# Patient Record
Sex: FEMALE | Race: BLACK OR AFRICAN AMERICAN | NOT HISPANIC OR LATINO | URBAN - METROPOLITAN AREA
[De-identification: names, ages, dates, MRNs, and addresses within clinical notes are randomized per-mention and may not be internally consistent; named-entity substitution may affect disease eponyms.]

---

## 2017-04-03 ENCOUNTER — IMPORTED ENCOUNTER (OUTPATIENT)
Dept: URBAN - METROPOLITAN AREA CLINIC 1 | Facility: CLINIC | Age: 52
End: 2017-04-03

## 2017-04-03 PROBLEM — H25.13: Noted: 2017-04-03

## 2017-04-03 PROBLEM — Z79.84: Noted: 2017-04-03

## 2017-04-03 PROBLEM — E11.3393: Noted: 2017-04-03

## 2017-04-03 PROBLEM — H01.004: Noted: 2017-04-03

## 2017-04-03 PROBLEM — H00.012: Noted: 2017-04-03

## 2017-04-03 PROBLEM — H01.001: Noted: 2017-04-03

## 2017-04-03 PROCEDURE — 92014 COMPRE OPH EXAM EST PT 1/>: CPT

## 2017-04-03 NOTE — PATIENT DISCUSSION
1.  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 Stressed the importance of strong glucose control. Advised of importance of at least yearly dilated examinations but to contact us immediately for any problems or concerns. 2. Anterior and Posterior Blepharitis OU - Begin Daily warm compresses and lid scrubs were recommended. 3. Hordeolum OD RLL (new): Begin lid scrubs w/ warm compresses daily. Could consider I&D if no improveemnt PRN. 4.  NS Cataract OU: Observe for now without intervention. The patient was advised to contact us if any change or worsening of visionLetter to PCP Return for an appointment in 6 mo 10 dfe with Dr. Sarai Mcguire.

## 2017-10-02 ENCOUNTER — IMPORTED ENCOUNTER (OUTPATIENT)
Dept: URBAN - METROPOLITAN AREA CLINIC 1 | Facility: CLINIC | Age: 52
End: 2017-10-02

## 2017-10-02 PROBLEM — E11.3293: Noted: 2017-10-02

## 2017-10-02 PROBLEM — H01.004: Noted: 2017-10-02

## 2017-10-02 PROBLEM — Z79.84: Noted: 2017-10-02

## 2017-10-02 PROBLEM — H04.123: Noted: 2017-10-02

## 2017-10-02 PROBLEM — H25.13: Noted: 2017-10-02

## 2017-10-02 PROBLEM — H01.001: Noted: 2017-10-02

## 2017-10-02 PROCEDURE — 92012 INTRM OPH EXAM EST PATIENT: CPT

## 2017-10-02 NOTE — PATIENT DISCUSSION
1.  DM Type II (Oral Meds) with Mild Nonproliferative Diabetic Retinopathy OU No Macular Edema:  Discussed the pathophysiology of diabetes and its effect on the eye and risk of blindness. Stressed the importance of strong glucose control. Advised of importance of at least yearly dilated examinations but to contact us immediately for any problems or concerns. 2. Cataract OU: Observe for now without intervention. The patient was advised to contact us if any change or worsening of vision3. Anterior/ Posterior Blepharitis OU - Continue Daily warm compresses and lid scrubs were recommended. 4. Dry Eyes OU - Begin ATs BID OU routinely. 5.  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 Return for an appointment in 6 mo 30 with Dr. Ever Steele.

## 2017-10-19 ENCOUNTER — IMPORTED ENCOUNTER (OUTPATIENT)
Dept: URBAN - METROPOLITAN AREA CLINIC 1 | Facility: CLINIC | Age: 52
End: 2017-10-19

## 2017-10-19 PROBLEM — H25.13: Noted: 2017-10-19

## 2017-10-19 PROBLEM — Z79.899: Noted: 2017-10-19

## 2017-10-19 PROBLEM — H01.004: Noted: 2017-10-19

## 2017-10-19 PROBLEM — M05.80: Noted: 2017-10-19

## 2017-10-19 PROBLEM — H01.001: Noted: 2017-10-19

## 2017-10-19 PROCEDURE — 92012 INTRM OPH EXAM EST PATIENT: CPT

## 2017-10-19 PROCEDURE — 92083 EXTENDED VISUAL FIELD XM: CPT

## 2017-10-19 NOTE — PATIENT DISCUSSION
1.   Plaquenil without evidence of Toxicity or Plaquenil retinopathy on High Risk Medication for Rheumatoid Arthritis. HVF showed non specific loss OU WNL OU. [id number] Ida Ramirez for patient to remain on Plaquenil. 2.  Anterior/Posterior Blepharitis OU - Daily warm compresses and lid scrubs were recommended. 3. Cataract OU: Observe for now without intervention. The patient was advised to contact us if any change or worsening of vision4. Dry Eyes OU 5. H/o DM w/ Mild NPDR OU6. H/o Episcleritis OSReturn for an appointment for Return as scheduled with Dr. Lavelle Romero.

## 2018-04-09 ENCOUNTER — IMPORTED ENCOUNTER (OUTPATIENT)
Dept: URBAN - METROPOLITAN AREA CLINIC 1 | Facility: CLINIC | Age: 53
End: 2018-04-09

## 2018-04-09 PROBLEM — Z79.899: Noted: 2018-04-09

## 2018-04-09 PROBLEM — E11.3293: Noted: 2018-04-09

## 2018-04-09 PROBLEM — E11.9: Noted: 2018-04-09

## 2018-04-09 PROBLEM — Z79.84: Noted: 2018-04-09

## 2018-04-09 PROBLEM — H01.002: Noted: 2018-04-09

## 2018-04-09 PROBLEM — H01.005: Noted: 2018-04-09

## 2018-04-09 PROBLEM — H25.13: Noted: 2018-04-09

## 2018-04-09 PROCEDURE — 92014 COMPRE OPH EXAM EST PT 1/>: CPT

## 2018-04-09 NOTE — PATIENT DISCUSSION
1.  DM Type II without sign of diabetic retinopathy and no blot heme on dilated retinal examination today OU No Macular Edema: Improved OU. (h/o Mild NPDR  OU- not visualized toay OU.) Discussed the pathophysiology of diabetes and its effect on the eye and risk of blindness. Stressed the importance of strong glucose control. Advised of importance of at least yearly dilated examinations but to contact us immediately for any problems or concerns. 2. Type II diabetes controlled by oral medications. 3.  Blepharitis anterior type OU- Much improved. Continue daily warm compresses and lid scrubs were recommended. 4. Cataract OU: Observe for now without intervention. The patient was advised to contact us if any change or worsening of vision5. Patient defers the refraction at today's visit    6. Plaquenil without evidence of Toxicity or Plaquenil retinopathy on High Risk Medication. [id number] Ida Ramirez for patient to remain on Plaquenil. 7.  Return for an appointment for a dfe HVF 10-2 OU in 6 months with Dr. Miles Alejandre.

## 2018-10-09 ENCOUNTER — IMPORTED ENCOUNTER (OUTPATIENT)
Dept: URBAN - METROPOLITAN AREA CLINIC 1 | Facility: CLINIC | Age: 53
End: 2018-10-09

## 2018-10-09 PROBLEM — H04.123: Noted: 2018-10-09

## 2018-10-09 PROBLEM — H25.13: Noted: 2018-10-09

## 2018-10-09 PROBLEM — Z79.899: Noted: 2018-10-09

## 2018-10-09 PROBLEM — H10.45: Noted: 2018-10-09

## 2018-10-09 PROBLEM — Z79.84: Noted: 2018-10-09

## 2018-10-09 PROBLEM — E11.3293: Noted: 2018-10-09

## 2018-10-09 PROBLEM — E11.3393: Noted: 2018-10-09

## 2018-10-09 PROCEDURE — 92083 EXTENDED VISUAL FIELD XM: CPT

## 2018-10-09 PROCEDURE — 92012 INTRM OPH EXAM EST PATIENT: CPT

## 2018-10-09 NOTE — PATIENT DISCUSSION
Plaquenil without evidence of Toxicity or Plaquenil retinopathy on High Risk Medication. [id number] Ida Ramirez for patient to remain on Plaquenil.

## 2018-10-09 NOTE — PATIENT DISCUSSION
1.  DM Type II with Moderate Nonproliferative Diabetic Retinopathy OU No Macular Edema: Progressed OU. Discussed the pathophysiology of diabetes and its effect on the eye and risk of blindness. Stressed the importance of strong glucose control. Advised of importance of at least yearly dilated examinations but to contact us immediately for any problems or concerns. 2. Type II diabetes controlled by oral medications. 3.  R.A.- on high risk med- Plaquenil- without evidence of Toxicity or Plaquenil retinopathy on High Risk Medication. [id number] Ida Ramirez for patient to remain on Plaquenil. Normal HVF OU4. Allergic Conjunctivitis OU- Recommend the start of Patanol OU BID (RX given.) If unable to get Patanol pt advised to start Zaditor OU BID. The condition was  discussed with the patient. Avoidance of allergens and cool compresses were recommended. 5. Dry Eyes OU- Controlled. The continuation of artificial tears were recommended. 6.  Cataract OU: Observe for now without intervention. The patient was advised to contact us if any change or worsening of vision7. Return for an appointment for 30 in 6 months with Dr. Juan Francisco Coats.

## 2018-10-09 NOTE — PATIENT DISCUSSION
DM Type II with Mild Nonproliferative Diabetic Retinopathy OU No Macular Edema:  Discussed the pathophysiology of diabetes and its effect on the eye and risk of blindness. Stressed the importance of strong glucose control. Advised of importance of at least yearly dilated examinations but to contact us immediately for any problems or concerns.

## 2019-05-02 ENCOUNTER — IMPORTED ENCOUNTER (OUTPATIENT)
Dept: URBAN - METROPOLITAN AREA CLINIC 1 | Facility: CLINIC | Age: 54
End: 2019-05-02

## 2019-05-02 PROBLEM — E11.3393: Noted: 2019-05-02

## 2019-05-02 PROBLEM — Z79.84: Noted: 2019-05-02

## 2019-05-02 PROCEDURE — 92014 COMPRE OPH EXAM EST PT 1/>: CPT

## 2019-05-02 PROCEDURE — 92015 DETERMINE REFRACTIVE STATE: CPT

## 2019-05-02 NOTE — PATIENT DISCUSSION
1. 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 Stressed the importance of strong glucose control. Advised of importance of at least yearly dilated examinations but to contact us immediately for any problems or concerns. 2. High Risk Medication Plaquenil due to Systemic RA with no signs of Plaquenil Retinopathy OU. Ok to remain on Plaquenil. 3.  Cataract OU: Observe for now without intervention. The patient was advised to contact us if any change or worsening of vision4. Anterior/ Posterior Blepharitis OU - Continue Daily warm compresses and lid scrubs were recommended. 5. Dry Eyes OU - Cont ATs BID OU routinely. Written script for Refresh given to patient today. 6.  H/o Episcleritis OD with h/o Systemic RA Episcleritis inactive. Letter to PCP MRx for glasses givenReturn for an appointment in 6 mo 10 dfe VF 10-2 color plates with Dr. Margaux Bee.

## 2019-07-22 ENCOUNTER — IMPORTED ENCOUNTER (OUTPATIENT)
Dept: URBAN - METROPOLITAN AREA CLINIC 1 | Facility: CLINIC | Age: 54
End: 2019-07-22

## 2019-11-13 NOTE — PATIENT DISCUSSION
Recommend Bilateral upper lid blepharoplasty. Medicare (discussed risks and benefits of sx. ..). Discussed surgery is functional through insurance, not cosmetic.

## 2019-11-13 NOTE — PATIENT DISCUSSION
This visual field clearly demonstrated a minimum of 49% loss of upper field of vision OU, with upper lid skin in repose and elevated by taping of the lid to demonstrate potential correction. This field shows that taping the lids significantly improved this patient's superior field of vision by approximately 49%, OU.

## 2020-02-03 NOTE — PROCEDURE NOTE: SURGICAL
"<span style=""font-weight: bold;"">MR #:&nbsp;</span> [id number]<br />  <br /> <span style=""font-weight: bold;""> PREOPERATIVE DIAGNOSIS:&nbsp;</span> Dermatochalasis upper lids<br />  <br /> <span style=""font-weight: bold;""> POSTOPERATIVE DIAGNOSIS:&nbsp;</span> Same<br />  <br /> <span style=""font-weight: bold;""> OPERATIVE PROCEDURE:</span>&nbsp;  Upper lid blepharoplasty with fat removal

## 2020-02-07 ENCOUNTER — IMPORTED ENCOUNTER (OUTPATIENT)
Dept: URBAN - METROPOLITAN AREA CLINIC 1 | Facility: CLINIC | Age: 55
End: 2020-02-07

## 2020-02-07 PROCEDURE — 92083 EXTENDED VISUAL FIELD XM: CPT

## 2020-02-07 PROCEDURE — 99213 OFFICE O/P EST LOW 20 MIN: CPT

## 2020-02-07 NOTE — PATIENT DISCUSSION
1.  Plaquenil without evidence of Toxicity or Plaquenil retinopathy on High Risk Medication. HVF remains WNL. [id number] Ida Ramirez for patient to remain on Plaquenil. 2.  DM Type II (Oral Med) with Mild Nonproliferative Diabetic Retinopathy OU No Macular Edema:  Discussed the pathophysiology of diabetes and its effect on the eye and risk of blindness. Stressed the importance of strong glucose control. Advised of importance of at least yearly dilated examinations but to contact us immediately for any problems or concerns. 3. Cataract OU -- Observe for now without intervention. The patient was advised to contact us if any change or worsening of vision   4. Anterior/ Posterior Blepharitis OU -- Continue Daily warm compresses and lid scrubs were recommended. 5. Dry Eyes OU - Cont ATs BID OU routinely. Written script for Refresh given to patient today. 6.  H/o Episcleritis OD with h/o Systemic RA Episcleritis inactive. Return for an appointment in 6 month 27 with Dr. Sergio Sullivan.

## 2020-02-10 PROBLEM — E11.3293: Noted: 2020-02-10

## 2020-02-10 PROBLEM — Z79.84: Noted: 2020-02-10

## 2020-02-10 PROBLEM — Z79.899: Noted: 2020-02-10

## 2020-02-10 PROBLEM — H25.813: Noted: 2020-02-10

## 2020-02-12 NOTE — PATIENT DISCUSSION
One week post op: great curve and shape, no infection, healing well, sutures intact and removed, D/C erythromycin.  Wear sunglasses and hat while outdoors. Avoid sun exposure. No restrictions in 5 days.

## 2020-07-21 ENCOUNTER — IMPORTED ENCOUNTER (OUTPATIENT)
Dept: URBAN - METROPOLITAN AREA CLINIC 1 | Facility: CLINIC | Age: 55
End: 2020-07-21

## 2020-07-21 PROBLEM — H10.31: Noted: 2020-07-21

## 2020-07-21 PROBLEM — H11.31: Noted: 2020-07-21

## 2020-07-21 PROCEDURE — 92012 INTRM OPH EXAM EST PATIENT: CPT

## 2020-07-21 NOTE — PATIENT DISCUSSION
1.  Unspecified Acute Conjunctivitis OD : Begin Zylet BID OD (sample x2 given use until gone) 2. Subconjunctival hemorrhage OD - Reassurance given. Condition discussed with patient. 3.  Cataract OU - Observe for now without intervention. The patient was advised to contact us if any change or worsening of vision   4. Anterior/ Posterior Blepharitis OU - Continue Daily warm compresses and lid scrubs were recommended. 5. Dry Eyes OU - Cont ATs BID OU routinely. Written script for Refresh given to patient today. 6.  H/o Episcleritis OD with h/o Systemic RA Episcleritis inactive. 7. H/o Plaquenil use without Retinopathy 8. H/o DM w/o DR DAVID Return for an appointment for Return as scheduled with Dr. Zoe Doyle.

## 2020-08-14 ENCOUNTER — IMPORTED ENCOUNTER (OUTPATIENT)
Dept: URBAN - METROPOLITAN AREA CLINIC 1 | Facility: CLINIC | Age: 55
End: 2020-08-14

## 2020-08-14 PROBLEM — Z79.84: Noted: 2020-08-14

## 2020-08-14 PROBLEM — E11.3293: Noted: 2020-08-14

## 2020-08-14 PROBLEM — H04.123: Noted: 2020-08-14

## 2020-08-14 PROBLEM — H25.13: Noted: 2020-08-14

## 2020-08-14 PROCEDURE — 92014 COMPRE OPH EXAM EST PT 1/>: CPT

## 2020-08-14 NOTE — PATIENT DISCUSSION
1.  DM Type II (Oral Medication) with Mild Nonproliferative Diabetic Retinopathy OU No Macular Edema:  Discussed the pathophysiology of diabetes and its effect on the eye and risk of blindness. Stressed the importance of strong glucose control. Advised of importance of at least yearly dilated examinations but to contact us immediately for any problems or concerns. 2. Cataract OU: Observe for now without intervention. The patient was advised to contact us if any change or worsening of vision3. Dry Eyes OU - Recommend ATs TID OU routinely 4. Anterior/ Posterior Blepharitis OU - Continue Daily warm compresses and lid scrubs were recommended. 5. H/o Plaquenil Use for RA w/out Toxicity 6. H/o Episcleritis OD with h/o Systemic RA Episcleritis inactive. Patient deferred [facility] Rx today. Return for an appointment in 6 months DFE/[place] HVF with Dr. Mahin Wilcox.

## 2021-02-25 ENCOUNTER — IMPORTED ENCOUNTER (OUTPATIENT)
Dept: URBAN - METROPOLITAN AREA CLINIC 1 | Facility: CLINIC | Age: 56
End: 2021-02-25

## 2021-02-25 PROBLEM — Z79.84: Noted: 2021-02-25

## 2021-02-25 PROBLEM — E11.3293: Noted: 2021-02-25

## 2021-02-25 PROBLEM — Z79.899: Noted: 2021-02-25

## 2021-02-25 PROBLEM — M05.80: Noted: 2021-02-25

## 2021-02-25 PROCEDURE — 92012 INTRM OPH EXAM EST PATIENT: CPT

## 2021-02-25 PROCEDURE — 92083 EXTENDED VISUAL FIELD XM: CPT

## 2021-02-25 NOTE — PATIENT DISCUSSION
1.  Plaquenil for RA without evidence of Toxicity or Plaquenil retinopathy on High Risk Medication. [id number] Ida Ramirez for patient to remain on Plaquenil. HVF WNL OU today. 2.  DM Type II (Oral Meds) with Mild Nonproliferative Diabetic Retinopathy OU No Macular Edema:  Discussed the pathophysiology of diabetes and its effect on the eye and risk of blindness. Stressed the importance of strong glucose control. Advised of importance of at least yearly dilated examinations but to contact us immediately for any problems or concerns. 3. Cataract OU: Observe for now without intervention. The patient was advised to contact us if any change or worsening of vision4. Dry Eyes OU - Recommend ATs TID OU routinely 5. Anterior/ Posterior Blepharitis OU - Continue Daily warm compresses and lid scrubs were recommended. 6. H/o Episcleritis OD with Systemic RA Episcleritis inactive. Return for an appointment in 6 months 27 with Dr. Lin Cotto.

## 2021-08-04 NOTE — PATIENT DISCUSSION
Recommend 2cc's of [employer]) ultra plus (discussed risks and benefits. .. ).

## 2021-08-31 ENCOUNTER — IMPORTED ENCOUNTER (OUTPATIENT)
Dept: URBAN - METROPOLITAN AREA CLINIC 1 | Facility: CLINIC | Age: 56
End: 2021-08-31

## 2021-08-31 PROBLEM — H16.143: Noted: 2021-08-31

## 2021-08-31 PROBLEM — H04.123: Noted: 2021-08-31

## 2021-08-31 PROCEDURE — 99214 OFFICE O/P EST MOD 30 MIN: CPT

## 2021-08-31 PROCEDURE — 92015 DETERMINE REFRACTIVE STATE: CPT

## 2021-08-31 NOTE — PATIENT DISCUSSION
1.  DM Type II (Oral Meds) with Mild Nonproliferative Diabetic Retinopathy OU No Macular Edema:  Discussed the pathophysiology of diabetes and its effect on the eye and risk of blindness. Stressed the importance of strong glucose control. Advised of importance of at least yearly dilated examinations but to contact us immediately for any problems or concerns. 2. Cataract OU -- Observe for now without intervention. The patient was advised to contact us if any change or worsening of vision3. Dry Eyes OU -- Pt undergoing w/u for Sjogrens. Continue routine use of ATs TID OU routinely. 4.  Anterior/Posterior Blepharitis OU - Continue Daily warm compresses and lid scrubs were recommended. 5. H/o Episcleritis OD with Systemic RA Episcleritis inactive. 6. H/o Systemic RA on High Risk Med. Return for an appointment in 6 months DFE/HVF 10-2 with Dr. Charlene Eller.

## 2021-09-07 PROBLEM — E11.3293: Noted: 2021-09-07

## 2021-09-07 PROBLEM — Z79.84: Noted: 2021-08-31

## 2021-09-07 PROBLEM — H16.143: Noted: 2021-08-31

## 2021-09-07 PROBLEM — H04.123: Noted: 2021-08-31

## 2022-04-02 ASSESSMENT — TONOMETRY
OD_IOP_MMHG: 16
OD_IOP_MMHG: 18
OS_IOP_MMHG: 18
OS_IOP_MMHG: 17
OS_IOP_MMHG: 14
OS_IOP_MMHG: 18
OS_IOP_MMHG: 17
OD_IOP_MMHG: 18
OD_IOP_MMHG: 15
OD_IOP_MMHG: 17
OD_IOP_MMHG: 18
OD_IOP_MMHG: 16
OD_IOP_MMHG: 17
OD_IOP_MMHG: 17
OS_IOP_MMHG: 16
OS_IOP_MMHG: 16
OS_IOP_MMHG: 18
OS_IOP_MMHG: 16

## 2022-04-02 ASSESSMENT — VISUAL ACUITY
OS_SC: 20/25
OD_CC: J1
OD_GLARE: 20/80
OD_SC: 20/20-2
OS_SC: 20/20
OD_SC: 20/25-2
OS_GLARE: 20/80
OD_SC: 20/20
OS_SC: 20/20
OS_SC: 20/20
OS_CC: J1+
OS_SC: 20/20
OS_CC: J1+
OD_CC: J1
OD_CC: J1
OD_SC: 20/20
OS_CC: J1
OD_CC: 20/30-2
OD_SC: 20/20
OD_GLARE: 20/60
OD_SC: 20/25-1
OD_CC: J1
OD_CC: J1+
OS_CC: J1
OD_SC: 20/20
OS_SC: 20/20
OD_SC: 20/20-2
OS_CC: J1+
OD_SC: 20/20
OS_CC: J1
OS_SC: 20/20-1
OD_SC: 20/20
OS_CC: 20/40-1
OS_SC: 20/20
OD_SC: 20/20
OD_CC: J1
OS_CC: J1
OS_SC: 20/25-2
OD_CC: J1
OS_SC: 20/20-2
OD_CC: J1+
OS_GLARE: 20/50
OD_CC: J1+
OS_SC: 20/20

## 2022-04-18 ENCOUNTER — COMPREHENSIVE EXAM (OUTPATIENT)
Dept: URBAN - METROPOLITAN AREA CLINIC 1 | Facility: CLINIC | Age: 57
End: 2022-04-18

## 2022-04-18 DIAGNOSIS — H04.123: ICD-10-CM

## 2022-04-18 DIAGNOSIS — E11.3293: ICD-10-CM

## 2022-04-18 DIAGNOSIS — H16.143: ICD-10-CM

## 2022-04-18 DIAGNOSIS — H25.13: ICD-10-CM

## 2022-04-18 DIAGNOSIS — M06.9: ICD-10-CM

## 2022-04-18 DIAGNOSIS — Z79.899: ICD-10-CM

## 2022-04-18 PROCEDURE — 92012 INTRM OPH EXAM EST PATIENT: CPT

## 2022-04-18 PROCEDURE — 92083 EXTENDED VISUAL FIELD XM: CPT

## 2022-04-18 RX ORDER — LIFITEGRAST 50 MG/ML: 1 SOLUTION/ DROPS OPHTHALMIC TWICE A DAY

## 2022-04-18 ASSESSMENT — VISUAL ACUITY
OS_CC: J1
OS_CC: 20/25
OD_CC: 20/25
OD_CC: J1

## 2022-04-18 ASSESSMENT — TONOMETRY
OS_IOP_MMHG: 15
OD_IOP_MMHG: 15

## 2022-04-18 NOTE — PATIENT DISCUSSION
(W/o Macular Edema) Discussed the pathophysiology of diabetes and its effect on the eye and risk of blindness. Stressed the importance of strong glucose control. Advised the importance of at least yearly dilated examinations, but to contact us immediately for any problems or concerns.

## 2022-04-18 NOTE — PATIENT DISCUSSION
Plaquenil without evidence of Toxicity or Plaquenil retinopathy on High Risk Medication for systemic RA. HVF today essentially WNL OU. [id number] Ida Ramirez for patient to remain on Plaquenil.

## 2022-10-24 ENCOUNTER — COMPREHENSIVE EXAM (OUTPATIENT)
Dept: URBAN - METROPOLITAN AREA CLINIC 1 | Facility: CLINIC | Age: 57
End: 2022-10-24

## 2022-10-24 DIAGNOSIS — H25.813: ICD-10-CM

## 2022-10-24 DIAGNOSIS — Z79.4: ICD-10-CM

## 2022-10-24 DIAGNOSIS — Z79.899: ICD-10-CM

## 2022-10-24 DIAGNOSIS — E11.3293: ICD-10-CM

## 2022-10-24 DIAGNOSIS — H16.143: ICD-10-CM

## 2022-10-24 DIAGNOSIS — H04.123: ICD-10-CM

## 2022-10-24 PROCEDURE — 92015 DETERMINE REFRACTIVE STATE: CPT

## 2022-10-24 PROCEDURE — 99214 OFFICE O/P EST MOD 30 MIN: CPT

## 2022-10-24 ASSESSMENT — VISUAL ACUITY
OU_CC: J1+
OD_CC: 20/25
OS_CC: 20/20

## 2022-10-24 ASSESSMENT — TONOMETRY
OD_IOP_MMHG: 17
OS_IOP_MMHG: 17

## 2022-10-24 NOTE — PATIENT DISCUSSION
Plaquenil without evidence of Toxicity or Plaquenil retinopathy on High Risk Medication for systemic RA. [id number] Ida Ramirez for patient to remain on Plaquenil.

## 2023-05-05 ENCOUNTER — FOLLOW UP (OUTPATIENT)
Dept: URBAN - METROPOLITAN AREA CLINIC 1 | Facility: CLINIC | Age: 58
End: 2023-05-05

## 2023-05-05 DIAGNOSIS — E11.3293: ICD-10-CM

## 2023-05-05 DIAGNOSIS — Z79.899: ICD-10-CM

## 2023-05-05 DIAGNOSIS — Z79.4: ICD-10-CM

## 2023-05-05 DIAGNOSIS — M06.9: ICD-10-CM

## 2023-05-05 PROCEDURE — 99213 OFFICE O/P EST LOW 20 MIN: CPT

## 2023-05-05 PROCEDURE — 92083 EXTENDED VISUAL FIELD XM: CPT

## 2023-05-05 ASSESSMENT — VISUAL ACUITY
OS_CC: 20/20
OS_BAT: 20/80
OD_CC: J1+
OD_BAT: 20/60
OD_CC: 20/25-1
OS_CC: J1+

## 2023-05-05 ASSESSMENT — TONOMETRY
OD_IOP_MMHG: 16
OS_IOP_MMHG: 16

## 2024-02-12 ENCOUNTER — EMERGENCY VISIT (OUTPATIENT)
Dept: URBAN - METROPOLITAN AREA CLINIC 1 | Facility: CLINIC | Age: 59
End: 2024-02-12

## 2024-02-12 DIAGNOSIS — H04.123: ICD-10-CM

## 2024-02-12 DIAGNOSIS — H01.021: ICD-10-CM

## 2024-02-12 DIAGNOSIS — H25.813: ICD-10-CM

## 2024-02-12 DIAGNOSIS — H01.024: ICD-10-CM

## 2024-02-12 PROCEDURE — 99213 OFFICE O/P EST LOW 20 MIN: CPT

## 2024-02-12 PROCEDURE — 92015 DETERMINE REFRACTIVE STATE: CPT

## 2024-02-12 ASSESSMENT — TONOMETRY
OD_IOP_MMHG: 17
OS_IOP_MMHG: 17

## 2024-02-12 ASSESSMENT — VISUAL ACUITY
OD_BAT: 20/70
OS_CC: 20/25
OS_BAT: 20/80
OD_CC: 20/20

## 2024-04-09 ENCOUNTER — PRE-OP/H&P (OUTPATIENT)
Dept: URBAN - METROPOLITAN AREA CLINIC 1 | Facility: CLINIC | Age: 59
End: 2024-04-09

## 2024-04-09 VITALS
BODY MASS INDEX: 41.59 KG/M2 | HEART RATE: 78 BPM | SYSTOLIC BLOOD PRESSURE: 139 MMHG | WEIGHT: 265 LBS | HEIGHT: 67 IN | DIASTOLIC BLOOD PRESSURE: 95 MMHG

## 2024-04-09 DIAGNOSIS — H25.813: ICD-10-CM

## 2024-04-09 PROCEDURE — 92136 OPHTHALMIC BIOMETRY: CPT

## 2024-04-09 PROCEDURE — 92025 CPTRIZED CORNEAL TOPOGRAPHY: CPT | Mod: NC

## 2024-04-09 PROCEDURE — 99499 UNLISTED E&M SERVICE: CPT

## 2024-04-09 ASSESSMENT — VISUAL ACUITY
OS_CC: 20/25
OD_CC: 20/20
OS_BAT: 20/80
OD_BAT: 20/70

## 2024-04-09 ASSESSMENT — TONOMETRY
OD_IOP_MMHG: 16
OS_IOP_MMHG: 16

## 2024-04-24 ENCOUNTER — SURGERY/PROCEDURE (OUTPATIENT)
Dept: URBAN - METROPOLITAN AREA SURGERY 1 | Facility: SURGERY | Age: 59
End: 2024-04-24

## 2024-04-24 DIAGNOSIS — H25.812: ICD-10-CM

## 2024-04-24 PROCEDURE — 66984 XCAPSL CTRC RMVL W/O ECP: CPT

## 2024-04-24 PROCEDURE — 68841 INSJ RX ELUT IMPLT LAC CANAL: CPT | Mod: 51,LT

## 2024-04-25 ENCOUNTER — POST-OP (OUTPATIENT)
Dept: URBAN - METROPOLITAN AREA CLINIC 2 | Facility: CLINIC | Age: 59
End: 2024-04-25

## 2024-04-25 DIAGNOSIS — Z96.1: ICD-10-CM

## 2024-04-25 PROCEDURE — 99024 POSTOP FOLLOW-UP VISIT: CPT

## 2024-04-25 ASSESSMENT — TONOMETRY: OS_IOP_MMHG: 19

## 2024-04-25 ASSESSMENT — VISUAL ACUITY
OS_PH: 20/20
OS_SC: 20/30

## 2024-04-30 ENCOUNTER — POST OP/EVAL OF SECOND EYE (OUTPATIENT)
Dept: URBAN - METROPOLITAN AREA CLINIC 1 | Facility: CLINIC | Age: 59
End: 2024-04-30

## 2024-04-30 VITALS
DIASTOLIC BLOOD PRESSURE: 95 MMHG | BODY MASS INDEX: 41.59 KG/M2 | SYSTOLIC BLOOD PRESSURE: 139 MMHG | HEART RATE: 78 BPM | WEIGHT: 265 LBS | HEIGHT: 67 IN

## 2024-04-30 DIAGNOSIS — H25.811: ICD-10-CM

## 2024-04-30 DIAGNOSIS — Z96.1: ICD-10-CM

## 2024-04-30 PROCEDURE — 92136 OPHTHALMIC BIOMETRY: CPT | Mod: 26

## 2024-04-30 PROCEDURE — 92025 CPTRIZED CORNEAL TOPOGRAPHY: CPT | Mod: NC

## 2024-04-30 ASSESSMENT — VISUAL ACUITY
OD_SC: 20/25
OS_SC: 20/20
OD_BAT: 20/70

## 2024-04-30 ASSESSMENT — TONOMETRY
OD_IOP_MMHG: 17
OS_IOP_MMHG: 17

## 2024-05-08 ENCOUNTER — SURGERY/PROCEDURE (OUTPATIENT)
Dept: URBAN - METROPOLITAN AREA SURGERY 1 | Facility: SURGERY | Age: 59
End: 2024-05-08

## 2024-05-08 DIAGNOSIS — H25.811: ICD-10-CM

## 2024-05-08 PROCEDURE — 68841 INSJ RX ELUT IMPLT LAC CANAL: CPT | Mod: 51,RT,79,RT

## 2024-05-08 PROCEDURE — 66984 XCAPSL CTRC RMVL W/O ECP: CPT | Mod: 79,RT

## 2024-05-09 ENCOUNTER — POST-OP (OUTPATIENT)
Dept: URBAN - METROPOLITAN AREA CLINIC 2 | Facility: CLINIC | Age: 59
End: 2024-05-09

## 2024-05-09 DIAGNOSIS — Z96.1: ICD-10-CM

## 2024-05-09 PROCEDURE — 99024 POSTOP FOLLOW-UP VISIT: CPT

## 2024-05-09 ASSESSMENT — TONOMETRY
OD_IOP_MMHG: 17
OS_IOP_MMHG: 17

## 2024-05-09 ASSESSMENT — VISUAL ACUITY
OD_SC: 20/40
OS_SC: 20/20
OD_PH: 20/20

## 2024-05-30 ENCOUNTER — POST-OP (OUTPATIENT)
Dept: URBAN - METROPOLITAN AREA CLINIC 1 | Facility: CLINIC | Age: 59
End: 2024-05-30

## 2024-05-30 DIAGNOSIS — Z96.1: ICD-10-CM

## 2024-05-30 ASSESSMENT — VISUAL ACUITY
OD_SC: 20/30
OS_SC: 20/20-1

## 2024-05-30 ASSESSMENT — TONOMETRY
OD_IOP_MMHG: 18
OS_IOP_MMHG: 18

## 2024-06-17 ENCOUNTER — EMERGENCY VISIT (OUTPATIENT)
Dept: URBAN - METROPOLITAN AREA CLINIC 1 | Facility: CLINIC | Age: 59
End: 2024-06-17

## 2024-06-17 DIAGNOSIS — H20.021: ICD-10-CM

## 2024-06-17 DIAGNOSIS — Z96.1: ICD-10-CM

## 2024-06-17 PROCEDURE — 99213 OFFICE O/P EST LOW 20 MIN: CPT | Mod: 24

## 2024-06-17 ASSESSMENT — TONOMETRY
OS_IOP_MMHG: 18
OD_IOP_MMHG: 20

## 2024-06-17 ASSESSMENT — VISUAL ACUITY
OS_CC: 20/20
OU_CC: 20/20
OD_CC: 20/20

## 2024-07-03 ENCOUNTER — FOLLOW UP (OUTPATIENT)
Dept: URBAN - METROPOLITAN AREA CLINIC 1 | Facility: CLINIC | Age: 59
End: 2024-07-03

## 2024-07-03 DIAGNOSIS — H20.021: ICD-10-CM

## 2024-07-03 PROCEDURE — 99213 OFFICE O/P EST LOW 20 MIN: CPT

## 2024-07-03 ASSESSMENT — TONOMETRY
OS_IOP_MMHG: 17
OD_IOP_MMHG: 20

## 2024-07-03 ASSESSMENT — VISUAL ACUITY
OS_CC: 20/20
OD_CC: 20/20-2

## 2024-12-13 ENCOUNTER — COMPREHENSIVE EXAM (OUTPATIENT)
Age: 59
End: 2024-12-13

## 2024-12-13 DIAGNOSIS — M06.9: ICD-10-CM

## 2024-12-13 DIAGNOSIS — Z79.899: ICD-10-CM

## 2024-12-13 DIAGNOSIS — H16.143: ICD-10-CM

## 2024-12-13 DIAGNOSIS — Z96.1: ICD-10-CM

## 2024-12-13 DIAGNOSIS — H01.021: ICD-10-CM

## 2024-12-13 DIAGNOSIS — H04.123: ICD-10-CM

## 2024-12-13 DIAGNOSIS — H26.493: ICD-10-CM

## 2024-12-13 DIAGNOSIS — Z79.4: ICD-10-CM

## 2024-12-13 DIAGNOSIS — H01.024: ICD-10-CM

## 2024-12-13 DIAGNOSIS — E11.3292: ICD-10-CM

## 2024-12-13 PROCEDURE — 92083 EXTENDED VISUAL FIELD XM: CPT

## 2024-12-13 PROCEDURE — 99214 OFFICE O/P EST MOD 30 MIN: CPT

## 2025-01-10 ENCOUNTER — CLINIC PROCEDURE ONLY (OUTPATIENT)
Age: 60
End: 2025-01-10

## 2025-01-10 DIAGNOSIS — H26.491: ICD-10-CM

## 2025-01-10 DIAGNOSIS — Z96.1: ICD-10-CM

## 2025-01-10 PROCEDURE — 66821 AFTER CATARACT LASER SURGERY: CPT

## 2025-01-24 ENCOUNTER — CLINIC PROCEDURE ONLY (OUTPATIENT)
Age: 60
End: 2025-01-24

## 2025-01-24 DIAGNOSIS — H26.492: ICD-10-CM

## 2025-01-24 DIAGNOSIS — Z96.1: ICD-10-CM

## 2025-01-24 PROCEDURE — 66821 AFTER CATARACT LASER SURGERY: CPT | Mod: 79,LT

## 2025-02-05 ENCOUNTER — DIAGNOSTICS ONLY (OUTPATIENT)
Age: 60
End: 2025-02-05

## 2025-02-05 DIAGNOSIS — Z98.890: ICD-10-CM

## 2025-02-05 DIAGNOSIS — Z96.1: ICD-10-CM

## 2025-02-05 PROCEDURE — 92015 DETERMINE REFRACTIVE STATE: CPT | Mod: NC

## 2025-02-05 NOTE — PATIENT DISCUSSION
Plaquenil without evidence of Toxicity or Plaquenil retinopathy on High Risk Medication. Dean Castillo for patient to remain on Plaquenil. actual

## 2025-08-08 ENCOUNTER — FOLLOW UP (OUTPATIENT)
Age: 60
End: 2025-08-08

## 2025-08-08 DIAGNOSIS — Z79.4: ICD-10-CM

## 2025-08-08 DIAGNOSIS — E11.3292: ICD-10-CM

## 2025-08-08 DIAGNOSIS — H04.123: ICD-10-CM

## 2025-08-08 DIAGNOSIS — H16.143: ICD-10-CM

## 2025-08-08 PROCEDURE — 99213 OFFICE O/P EST LOW 20 MIN: CPT
